# Patient Record
Sex: FEMALE | Employment: UNEMPLOYED | URBAN - METROPOLITAN AREA
[De-identification: names, ages, dates, MRNs, and addresses within clinical notes are randomized per-mention and may not be internally consistent; named-entity substitution may affect disease eponyms.]

---

## 2020-06-02 ENCOUNTER — TRANSFERRED RECORDS (OUTPATIENT)
Dept: HEALTH INFORMATION MANAGEMENT | Facility: CLINIC | Age: 40
End: 2020-06-02

## 2021-04-27 ENCOUNTER — TRANSFERRED RECORDS (OUTPATIENT)
Dept: HEALTH INFORMATION MANAGEMENT | Facility: CLINIC | Age: 41
End: 2021-04-27

## 2021-04-28 ENCOUNTER — TRANSCRIBE ORDERS (OUTPATIENT)
Dept: OTHER | Age: 41
End: 2021-04-28

## 2021-04-28 ENCOUNTER — DOCUMENTATION ONLY (OUTPATIENT)
Dept: OTHER | Age: 41
End: 2021-04-28

## 2021-04-28 DIAGNOSIS — C78.00 BREAST CANCER METASTASIZED TO LUNG (H): ICD-10-CM

## 2021-04-28 DIAGNOSIS — C50.919 BREAST CANCER (H): ICD-10-CM

## 2021-04-28 DIAGNOSIS — C50.919: ICD-10-CM

## 2021-04-28 DIAGNOSIS — C50.911 CANCER OF RIGHT BREAST METASTATIC TO BRAIN (H): Primary | ICD-10-CM

## 2021-04-28 DIAGNOSIS — C50.919 BREAST CANCER METASTASIZED TO LUNG (H): ICD-10-CM

## 2021-04-28 DIAGNOSIS — C79.31 CANCER OF RIGHT BREAST METASTATIC TO BRAIN (H): Primary | ICD-10-CM

## 2021-04-28 NOTE — PROGRESS NOTES
Rightfax referral received from [facility] - TERRIE Young is requesting an appointment with med onc for dx: breast cancer - No patient demographics included, only a name,  & diagnosis. I called [facility] & requested the patient demographics/face sheet. Marilu (HIM) will fax to NP Scheduling ([phone])

## 2021-04-30 NOTE — TELEPHONE ENCOUNTER
Action    Action Taken 4/30/21:    -[employer] Tracking/[place] ([place]) - Path: [id number]    5/5/21:    -Slides from [facility] received    -imaging resolved to PACS, updated CT Head: 5/3/21 requested/[facility].  9:48 AM    5/7/21:    Imaging resolved to PACS  8:31 AM       RECORDS STATUS - BREAST    RECORDS REQUESTED FROM: [facility] (Imaging previously resolved)   DATE REQUESTED:    NOTES DETAILS STATUS   OFFICE NOTE from referring provider [facility] Dr Kimberley Young   OFFICE NOTE from medical oncologist [facility] Dr. Joshua Villegas: 4/29/21   OFFICE NOTE from surgeon [facility] Dr. Pranay Villarreal   OFFICE NOTE from radiation oncologist [facility] Dr. Daniel Willis: 4/28/21   DISCHARGE SUMMARY from hospital [facility] 4/10/21, 3/8/21, 8/30/19   DISCHARGE REPORT from the [facility] 4/24/21, 4/15/21, 2/19/21, 1/7/21, 12/19/20, 10/3/20, 9/2/20, 8/25/20, 8/20/20, 7/2/20, 7/1/20, 5/16/20, 5/2/20, 4/16/20. 10/31/19, 9/17/19, 5/8/19, 3/16/19, 2/22/19   Spirometry Received 5/5, Sent to HIM for upload 4/28/21: [facility]   OPERATIVE REPORT [facility] 4/12/21: PleurX Placement    4/8/21, 3/15/21, 2/19/21, 1/11/21. 8/25/20: Thoracentesis    8/30/19: Mastectomy    8/20/19: Bx Left Axillary Lymph Node    5/9/19L Coronary Angiogram    2/19/19: Subcutaneous Port Insertion   MEDICATION LIST [facility]   CLINICAL TRIAL TREATMENTS TO DATE     LABS     PATHOLOGY REPORTS  (Tissue diagnosis, Stage, ER/UT percentage positive and intensity of staining, HER2 IHC, FISH, and all biopsies from breast and any distant metastasis)                 [facility]/[place], Reports in CE, Slides received 5/5 8/25/20: [id number]  6/2/19: [id number]  8/20/19: [id number]  1/23/19: [id number]   GENONOMIC TESTING     TYPE:   (Next Generation Sequencing, including Foundation One testing, and Oncotype score) Received 5/5, Sent to HIM for upload 6/2/20: PDL-1   IMAGING (NEED IMAGES & REPORT)     XR PACS 4/23/21 - 11/13/20: [facility]   CT SCANS  PACS 5/3/21 - 11/13/20: [facility]   MRI PACS 4/23/21 - 11/13/20: [facility]   MAMMO     ULTRASOUND PACS 4/23/21 - 11/13/20: [facility]   PET PACS 8/26/20, 6/1/20, 8/14/19, 2/11/19: [facility]   BONE SCAN PACS 9/1/20: [facility]   BRAIN MRI PACS 4/23/21 - 11/13/20: [facility]

## 2021-05-06 PROCEDURE — 999N001032 HC STATISTIC REVIEW OUTSIDE SLIDES TC 88321: Performed by: INTERNAL MEDICINE

## 2021-05-06 PROCEDURE — 88321 CONSLTJ&REPRT SLD PREP ELSWR: CPT | Performed by: PATHOLOGY

## 2021-05-10 ENCOUNTER — PRE VISIT (OUTPATIENT)
Dept: ONCOLOGY | Facility: CLINIC | Age: 41
End: 2021-05-10

## 2021-05-10 ENCOUNTER — VIRTUAL VISIT (OUTPATIENT)
Dept: ONCOLOGY | Facility: CLINIC | Age: 41
End: 2021-05-10
Attending: INTERNAL MEDICINE
Payer: MEDICAID

## 2021-05-10 DIAGNOSIS — C79.31 MALIGNANT NEOPLASM OF BREAST METASTATIC TO BRAIN, UNSPECIFIED LATERALITY (H): Primary | ICD-10-CM

## 2021-05-10 DIAGNOSIS — C50.919 MALIGNANT NEOPLASM OF BREAST METASTATIC TO BRAIN, UNSPECIFIED LATERALITY (H): Primary | ICD-10-CM

## 2021-05-10 PROCEDURE — 999N001193 HC VIDEO/TELEPHONE VISIT; NO CHARGE

## 2021-05-10 PROCEDURE — 99205 OFFICE O/P NEW HI 60 MIN: CPT | Mod: 95 | Performed by: INTERNAL MEDICINE

## 2021-05-10 RX ORDER — FUROSEMIDE 20 MG
20 TABLET ORAL
COMMUNITY
Start: 2020-11-19

## 2021-05-10 RX ORDER — FAMOTIDINE 20 MG/1
20 TABLET, FILM COATED ORAL
COMMUNITY
Start: 2021-03-09

## 2021-05-10 RX ORDER — POTASSIUM CHLORIDE 1500 MG/1
20 TABLET, EXTENDED RELEASE ORAL
COMMUNITY
Start: 2021-01-25

## 2021-05-10 RX ORDER — CITALOPRAM HYDROBROMIDE 20 MG/1
TABLET ORAL
COMMUNITY
Start: 2021-04-15

## 2021-05-10 RX ORDER — DEXAMETHASONE 4 MG/1
TABLET ORAL
COMMUNITY
Start: 2021-04-26

## 2021-05-10 RX ORDER — CARVEDILOL 3.12 MG/1
3.12 TABLET ORAL
COMMUNITY
Start: 2020-07-27

## 2021-05-10 RX ORDER — LEVETIRACETAM 500 MG/1
1500 TABLET ORAL
COMMUNITY
Start: 2021-05-04

## 2021-05-10 RX ORDER — DIAZEPAM ORAL SOLUTION (CONCENTRATE) 5 MG/ML
5 SOLUTION ORAL
COMMUNITY
Start: 2021-05-03

## 2021-05-10 NOTE — LETTER
"    5/10/2021         RE: Bipin Bai  [address] [place] [address] [address]  [facility] [address]        Dear Colleague,    Thank you for referring your patient, Bipin Bai, to the [facility]. Please see a copy of my visit note below.    Bipin is a 41 year old who is being evaluated via a billable video visit.      How would you like to obtain your AVS? MyChart  If the video visit is dropped, the invitation should be resent by: Text to cell phone: [phone]  Will anyone else be joining your video visit? No      I have reviewed and updated the patient's allergies and medication list.    Concerns: none  Refills: none     Vitals - Patient Reported  Weight (Patient Reported): 68 kg (150 lb)  Height (Patient Reported): 165.1 cm (5' 5\")  BMI (Based on Pt Reported Ht/Wt): 24.96    Chle Domingo CMA        Video Time:  45 min    Video-Visit Details    Type of service:  Video Visit    Originating Location (pt. Location): Home    Distant Location (provider location):  [facility]     Platform used for Video Visit: [employer]    More than 70 min were spent in reviewing outside records, outside pathology, images, and in speaking with the patient and coordinating care with specialists in [place].      May 10, 2021    Pt is seeking second opinion regarding metastatic breast cancer, brain and lung metastases    PRIMARY DIAGNOSES:  1. Premenopausal 39 y/o female with metastatic TRIPLE NEGATIVE breast cancer (lungs, right pleural cavity, LNs above and below diaphragm, skeleton and CNS)(+ cytology pleural fluid Aug 2020). Germline testing negative. Somatic NGS with PIK3CA mutation.   Disease refractory to multiple salvage regimens including Nab-Paclitaxel/atezolizumab, sacituzumab govitecan and alpelisib/fulvestrant.  Currently on salvage ixabepilone since April 2021.     2. CNS metastatic disease (2 lesions only) s/p SBRT Sept/Oct 2020. Complicated with radionecrosis and secondary " seizure, controlled with steroids and keppra.    3. Recurrent malignant right pleural effusion, s/p PleurX cath placement April 2021.    4. Treatment-induced diabetes (alpelisib and steroids) with recent decompensation.    5. PE (small burden non occlusive PA branch RLL) April 2021. Apixaban stopped due to allergic reaction. Currently on LMWH since 4/27/21.    6. Multiple brain metastases diagnosed after a seizure; was scheduled for whole brain radiation, pt was readmitted to the hospital, and was discharged to home on hospice care.    ONCOLOGIC HISTORY:  Dx Jan 2019 stage IIIA (cT3N2) ER/[place]/her 2 negative right breast adenoCA (initial bx ER+).  Neoadjuvant dose dense AC followed by T.  S/p right mastectomy with ALND Aug 2019 (contralateral prophylactic mastectomy). Path with 2 lesions (multifocal) 6.5 and 1.5 cm, grade 3/3, 3/4 LN+ (extranodal extension), LV+  Capecitabine x 6 cycles until Jan 2020.  XRT 60 Gy to right chest wall and draining lymphatics March 2020.  Tamoxifen since Jan 2020.  Disease progression May 2020 (PET/CT with new lung, hilar and mediastinal LN and small peritoneal implant adjacent to liver).  Lung bx c/w metastatic PD adenoCA triple negative.  Brain MRI negative.  Salvage Nab-Paclitaxel + Atezolizumab since 6/9/20. Completed 3 cycles on 8/11. Overall good tolerance with exception of PNP and fatigue.  ER visit 7/1 with SOB and chest pain with CT showing mild progression of lung nodules. 7/22 ER visit abdominal pain. CT abd/pelvis no new findings.  ER visit 8/20 with SOB and cough. Troponin negative, D-dimer +, CTA no PE but noted disease progression.  PET/CT 8/26 - Disease progression (right pleural effusion, LN above and below diaphragm, bone).  US-guided thoracentesis 700 cc clear straw colored fluid. Cytology pleural fluid + metastatic adenoCA c/w breast primary.     Sept 2020 - Brain MRI 2.7 cm enhancing lesion right post parietal.  SRS 27 Gy completed 9/10/20.  9/17/20 - Started ADC  Sacituzumab/govitecan 10 mg/kg on D1 and D8 every 21 days with G-CSF support.  9/25/20 - Brain MRI new 0.8 cm lesion left periventricular (lateral ventricle), previous lesion right parietal area with post XRT changes.  SRS 27 Gy to new lesion completed 10/9/20.     11/23/20 restaging studies with brain MRI and CT CAP with evidence of GA after 4 cycles of ADC.     Jan 2021 - Disease progression (lung metastases, right pleural effusion and suspected lymphangitic spread).  1/16/21 - Started salvage ALPELISIB (+ Faslodex).    4/10/21 - Disease progression with CT showing worsening bilateral lung lesions, right pleural effusion, mediastinal adenopathy. Non occlusive small thrombus in RLL PA. Started apixaban 4/12/21.    4/12/21 - Pleurx cath placed right pleural cavity for recurrent malignant pleural effusion.    4/15/21 c/o auditory hallucinations. CT brain (no contrast) no bleeding or mass effect.    4/22/21 - Started ixabepilone.    4/23/21 - Follow up MRI (previously ordered by Dr. Willis), CNS progression in cerebellum and hemispheres with at least 8 new enhancing lesions with moderate amount of edema.  Was scheduled WBRT 5/3/21 --> this was never started    HISTORY OF PRESENT ILLNESS:  Bipin Bai is a 41 year old female who presents to the Hematology/Oncology for reassessment of refractory triple negative MBC.  She is here for a second opinion.    VIDEO VISIT    Bipin comes today over a video visit with her sister for a second opinion and consultation regarding metastatic refractory triple-negative breast cancer.      Her disease is as outlined above.  In discussion with Bipin, she was recently hospitalized with a second seizure.  At that time, multiple brain lesions were visualized.  It was recommended previously that she receive whole brain radiation therapy.  This was never started and she was discharged with hospice care.  She comes in today for a second opinion with her sister.  I am unsure that she wants  to do hospice care and is wondering what her treatment options are.    In discussion with her and her sister, she reports no pain.  Her biggest limiting factor is some paralysis of her left side since she had a seizure.  She notices weakness on her left more than right side.  It is hard to get out of bed without assistance.  She does use a walker or wheelchair around her at home.  She is getting home care services with [facility] and they enrolled on Friday 05/07/2021.    In discussions with them, they expressed their Anabaptism sp.  They would like to continue to try to do everything possible for her disease and treatment.  In discussion, she takes a nap frequently during the day.  It sounds like a performance status of approximately 2.  Since she has been on treatment with Keppra and low doses of Decadron, her energy is improved and she is sleeping only at nighttime and not during the day.  She otherwise has remained in bed, given her weakness.    No fevers or chills.  No shortness of breath.  No nausea, vomiting, diarrhea or constipation.  She does have a known pleural effusion as well as PEs.  She does not describe any symptoms related to this at this time.    She is here on the video with her sister.  She is being treated with Dr. Willis in [place].    Her 10-point review of systems is otherwise negative.  Most recently, she was on treatment with salvage ixabepilone.  It does not appear she has had any therapy with gemcitabine, carboplatin, eribulin.       PMH, SH and FH reviewed in EMR and unchanged.    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)     Depression, unspecified depression type     Cancer related pain      Fatigue due to treatment     History of lymph node dissection of right axilla     Bilateral leg weakness     At risk for falls     Lymphangitic lung metastasis (HCC)     Brain metastasis (HCC)     Metastatic breast cancer (HCC)     Focal seizure (HCC)     Malignant pleural effusion     Lymphedema of right upper extremity     Hypokalemia     Hypomagnesemia     Other pulmonary embolism without acute cor pulmonale (HCC)     Current Outpatient Medications   Medication     carvedilol (COREG) 3.125 MG tablet     citalopram (CELEXA) 20 MG tablet     dexamethasone (DECADRON) 4 MG tablet     diazepam (VALIUM) 5 MG/ML (HIGH CONC) solution     famotidine (PEPCID) 20 MG tablet     furosemide (LASIX) 20 MG tablet     levETIRAcetam (KEPPRA) 500 MG tablet     potassium chloride ER (KLOR-CON M) 20 MEQ CR tablet     No current facility-administered medications for this visit.      Allergies reviewed.    SH:  .  Moravian.  She is here with her sister today.  She has another sister and another brother near by who are supportive.      No family history of malignancy.    GENERAL: Healthy, alert and no distress  EYES: Eyes grossly normal to inspection.  No discharge or erythema, or obvious scleral/conjunctival abnormalities.  RESP: No audible wheeze, cough, or visible cyanosis.  No visible retractions or increased work of breathing.    SKIN: Visible skin clear. No significant rash, abnormal pigmentation or lesions.  NEURO: Cranial nerves grossly intact.  Mentation and speech appropriate for age.  PSYCH: Mentation appears normal, affect normal/bright, judgement and insight intact, normal speech and appearance well-groomed.      Reviewed recent labs, imaging and pathology    A/P:  40 y/o female with triple negative breast cancer with brain metastases multiple lung metastases now s/p multiple lines of chemotherapy including AC, taxol, xeloda, ixebipilone, Abraxane/atezolizumab, as well as a variety of hormone therapies given the tumor  was weakly positive.    I reviewed today with Bipin her overall disease history as outlined above.  I discussed with her that, given her brain metastases and a performance status of 2, it would be reasonable to consider pursuing whole brain radiation therapy.  We discussed that this would require the patient to go into clinic daily.  She and her sister expressed that they are able to do this.  We reviewed that currently she is enrolled in hospice care in the [facility].  It is not clear to me whether she would need to un-enroll in hospice in order to participate in whole brain radiation therapy.  We reviewed overall that currently her symptoms from her brain metastases include seizures as well as left sided weakness.  It is unclear whether this would improve or resolve with the use of radiation therapy. There may be some benefit in potentially preventing further worsening of these symptoms.    I discussed with her I would review her case with Dr. Willis, who has seen her in the [facility]. I did put a call into his office today.  Continue Decadron and Keppra.    As far as systemic therapy, I discussed with her that given her performance status of 2 I would not recommend IV chemotherapy.  In rare instances, these tumors can be androgen positive in which case they may benefit from androgen deprivation.  She is interested in hearing more about this today.  We have received her pathology specimens and I will ask the pathologist to add on an androgen stain.    I put a call into her primary oncologist in the [facility] to better understand her story.  She understands that currently she is enrolled in hospice care and she is asking questions about further treatment. Given she is alert and oriented and speaking clearly and fluently, it would be reasonable to have further conversations with her primary oncology team there to better understand treatment options for the patient.    She reports no pain.    For her seizures, she is on  Decadron and Keppra.    I will plan to retouch base with Anafarshad when I have this other information back for the androgen receptor as well as in discussing her situation with my colleagues.    It was a pleasure to see Bipin for a second opinion and consultation.    Again, thank you for allowing me to participate in the care of your patient.        Sincerely,        Danni Rao MD

## 2021-05-10 NOTE — PROGRESS NOTES
"Bipin is a 41 year old who is being evaluated via a billable video visit.      How would you like to obtain your AVS? MyChart  If the video visit is dropped, the invitation should be resent by: Text to cell phone: [phone]  Will anyone else be joining your video visit? No      I have reviewed and updated the patient's allergies and medication list.    Concerns: none  Refills: none     Vitals - Patient Reported  Weight (Patient Reported): 68 kg (150 lb)  Height (Patient Reported): 165.1 cm (5' 5\")  BMI (Based on Pt Reported Ht/Wt): 24.96    Chel Domingo CMA        Video Time:  45 min    Video-Visit Details    Type of service:  Video Visit    Originating Location (pt. Location): Home    Distant Location (provider location):  [facility]     Platform used for Video Visit: Osmopure    More than 70 min were spent in reviewing outside records, outside pathology, images, and in speaking with the patient and coordinating care with specialists in [place].      May 10, 2021    Pt is seeking second opinion regarding metastatic breast cancer, brain and lung metastases    PRIMARY DIAGNOSES:  1. Premenopausal 41 y/o female with metastatic TRIPLE NEGATIVE breast cancer (lungs, right pleural cavity, LNs above and below diaphragm, skeleton and CNS)(+ cytology pleural fluid Aug 2020). Germline testing negative. Somatic NGS with PIK3CA mutation.   Disease refractory to multiple salvage regimens including Nab-Paclitaxel/atezolizumab, sacituzumab govitecan and alpelisib/fulvestrant.  Currently on salvage ixabepilone since April 2021.     2. CNS metastatic disease (2 lesions only) s/p SBRT Sept/Oct 2020. Complicated with radionecrosis and secondary seizure, controlled with steroids and keppra.    3. Recurrent malignant right pleural effusion, s/p PleurX cath placement April 2021.    4. Treatment-induced diabetes (alpelisib and steroids) with recent decompensation.    5. PE (small burden non occlusive PA " branch RLL) April 2021. Apixaban stopped due to allergic reaction. Currently on LMWH since 4/27/21.    6. Multiple brain metastases diagnosed after a seizure; was scheduled for whole brain radiation, pt was readmitted to the hospital, and was discharged to home on hospice care.    ONCOLOGIC HISTORY:  Dx Jan 2019 stage IIIA (cT3N2) ER/GA/her 2 negative right breast adenoCA (initial bx ER+).  Neoadjuvant dose dense AC followed by T.  S/p right mastectomy with ALND Aug 2019 (contralateral prophylactic mastectomy). Path with 2 lesions (multifocal) 6.5 and 1.5 cm, grade 3/3, 3/4 LN+ (extranodal extension), LV+  Capecitabine x 6 cycles until Jan 2020.  XRT 60 Gy to right chest wall and draining lymphatics March 2020.  Tamoxifen since Jan 2020.  Disease progression May 2020 (PET/CT with new lung, hilar and mediastinal LN and small peritoneal implant adjacent to liver).  Lung bx c/w metastatic PD adenoCA triple negative.  Brain MRI negative.  Salvage Nab-Paclitaxel + Atezolizumab since 6/9/20. Completed 3 cycles on 8/11. Overall good tolerance with exception of PNP and fatigue.  ER visit 7/1 with SOB and chest pain with CT showing mild progression of lung nodules. 7/22 ER visit abdominal pain. CT abd/pelvis no new findings.  ER visit 8/20 with SOB and cough. Troponin negative, D-dimer +, CTA no PE but noted disease progression.  PET/CT 8/26 - Disease progression (right pleural effusion, LN above and below diaphragm, bone).  US-guided thoracentesis 700 cc clear straw colored fluid. Cytology pleural fluid + metastatic adenoCA c/w breast primary.     Sept 2020 - Brain MRI 2.7 cm enhancing lesion right post parietal.  SRS 27 Gy completed 9/10/20.  9/17/20 - Started ADC Sacituzumab/govitecan 10 mg/kg on D1 and D8 every 21 days with G-CSF support.  9/25/20 - Brain MRI new 0.8 cm lesion left periventricular (lateral ventricle), previous lesion right parietal area with post XRT changes.  SRS 27 Gy to new lesion completed  10/9/20.     11/23/20 restaging studies with brain MRI and CT CAP with evidence of OH after 4 cycles of ADC.     Jan 2021 - Disease progression (lung metastases, right pleural effusion and suspected lymphangitic spread).  1/16/21 - Started salvage ALPELISIB (+ Faslodex).    4/10/21 - Disease progression with CT showing worsening bilateral lung lesions, right pleural effusion, mediastinal adenopathy. Non occlusive small thrombus in RLL PA. Started apixaban 4/12/21.    4/12/21 - Pleurx cath placed right pleural cavity for recurrent malignant pleural effusion.    4/15/21 c/o auditory hallucinations. CT brain (no contrast) no bleeding or mass effect.    4/22/21 - Started ixabepilone.    4/23/21 - Follow up MRI (previously ordered by Dr. Willis), CNS progression in cerebellum and hemispheres with at least 8 new enhancing lesions with moderate amount of edema.  Was scheduled WBRT 5/3/21 --> this was never started    HISTORY OF PRESENT ILLNESS:  Bipin Bai is a 41 year old female who presents to the Hematology/Oncology for reassessment of refractory triple negative MBC.  She is here for a second opinion.    VIDEO VISIT    Bipin comes today over a video visit with her sister for a second opinion and consultation regarding metastatic refractory triple-negative breast cancer.      Her disease is as outlined above.  In discussion with Bipin, she was recently hospitalized with a second seizure.  At that time, multiple brain lesions were visualized.  It was recommended previously that she receive whole brain radiation therapy.  This was never started and she was discharged with hospice care.  She comes in today for a second opinion with her sister.  I am unsure that she wants to do hospice care and is wondering what her treatment options are.    In discussion with her and her sister, she reports no pain.  Her biggest limiting factor is some paralysis of her left side since she had a seizure.  She notices weakness on her  left more than right side.  It is hard to get out of bed without assistance.  She does use a walker or wheelchair around her at home.  She is getting home care services with [facility] and they enrolled on Friday 05/07/2021.    In discussions with them, they expressed their [employer] sp.  They would like to continue to try to do everything possible for her disease and treatment.  In discussion, she takes a nap frequently during the day.  It sounds like a performance status of approximately 2.  Since she has been on treatment with Keppra and low doses of Decadron, her energy is improved and she is sleeping only at nighttime and not during the day.  She otherwise has remained in bed, given her weakness.    No fevers or chills.  No shortness of breath.  No nausea, vomiting, diarrhea or constipation.  She does have a known pleural effusion as well as PEs.  She does not describe any symptoms related to this at this time.    She is here on the video with her sister.  She is being treated with Dr. Willis in [place].    Her 10-point review of systems is otherwise negative.  Most recently, she was on treatment with salvage ixabepilone.  It does not appear she has had any therapy with gemcitabine, carboplatin, eribulin.       PMH, SH and FH reviewed in EMR and unchanged.    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)     Depression, unspecified depression type     Cancer related pain     Fatigue due to treatment     History of lymph node dissection of right axilla     Bilateral leg weakness     At risk for falls     Lymphangitic lung metastasis (HCC)     Brain metastasis (HCC)     Metastatic breast cancer (HCC)     Focal  seizure (HCC)     Malignant pleural effusion     Lymphedema of right upper extremity     Hypokalemia     Hypomagnesemia     Other pulmonary embolism without acute cor pulmonale (HCC)     Current Outpatient Medications   Medication     carvedilol (COREG) 3.125 MG tablet     citalopram (CELEXA) 20 MG tablet     dexamethasone (DECADRON) 4 MG tablet     diazepam (VALIUM) 5 MG/ML (HIGH CONC) solution     famotidine (PEPCID) 20 MG tablet     furosemide (LASIX) 20 MG tablet     levETIRAcetam (KEPPRA) 500 MG tablet     potassium chloride ER (KLOR-CON M) 20 MEQ CR tablet     No current facility-administered medications for this visit.      Allergies reviewed.    SH:  .  Spiritism.  She is here with her sister today.  She has another sister and another brother near by who are supportive.      No family history of malignancy.    GENERAL: Healthy, alert and no distress  EYES: Eyes grossly normal to inspection.  No discharge or erythema, or obvious scleral/conjunctival abnormalities.  RESP: No audible wheeze, cough, or visible cyanosis.  No visible retractions or increased work of breathing.    SKIN: Visible skin clear. No significant rash, abnormal pigmentation or lesions.  NEURO: Cranial nerves grossly intact.  Mentation and speech appropriate for age.  PSYCH: Mentation appears normal, affect normal/bright, judgement and insight intact, normal speech and appearance well-groomed.      Reviewed recent labs, imaging and pathology    A/P:  40 y/o female with triple negative breast cancer with brain metastases multiple lung metastases now s/p multiple lines of chemotherapy including AC, taxol, xeloda, ixebipilone, Abraxane/atezolizumab, as well as a variety of hormone therapies given the tumor was weakly positive.    I reviewed today with Bipin her overall disease history as outlined above.  I discussed with her that, given her brain metastases and a performance status of 2, it would be reasonable to consider pursuing whole brain  radiation therapy.  We discussed that this would require the patient to go into clinic daily.  She and her sister expressed that they are able to do this.  We reviewed that currently she is enrolled in hospice care in the [facility].  It is not clear to me whether she would need to un-enroll in hospice in order to participate in whole brain radiation therapy.  We reviewed overall that currently her symptoms from her brain metastases include seizures as well as left sided weakness.  It is unclear whether this would improve or resolve with the use of radiation therapy. There may be some benefit in potentially preventing further worsening of these symptoms.    I discussed with her I would review her case with Dr. Willis, who has seen her in the [facility]. I did put a call into his office today.  Continue Decadron and Keppra.    As far as systemic therapy, I discussed with her that given her performance status of 2 I would not recommend IV chemotherapy.  In rare instances, these tumors can be androgen positive in which case they may benefit from androgen deprivation.  She is interested in hearing more about this today.  We have received her pathology specimens and I will ask the pathologist to add on an androgen stain.    I put a call into her primary oncologist in the [facility] to better understand her story.  She understands that currently she is enrolled in hospice care and she is asking questions about further treatment. Given she is alert and oriented and speaking clearly and fluently, it would be reasonable to have further conversations with her primary oncology team there to better understand treatment options for the patient.    She reports no pain.    For her seizures, she is on Decadron and Keppra.    I will plan to retouch base with Bipin when I have this other information back for the androgen receptor as well as in discussing her situation with my colleagues.    It was a pleasure to see Bipin for a second  opinion and consultation.

## 2021-05-18 LAB — COPATH REPORT: NORMAL

## 2021-06-27 ENCOUNTER — HEALTH MAINTENANCE LETTER (OUTPATIENT)
Age: 41
End: 2021-06-27

## 2021-10-17 ENCOUNTER — HEALTH MAINTENANCE LETTER (OUTPATIENT)
Age: 41
End: 2021-10-17

## 2022-07-24 ENCOUNTER — HEALTH MAINTENANCE LETTER (OUTPATIENT)
Age: 42
End: 2022-07-24

## 2022-10-03 ENCOUNTER — HEALTH MAINTENANCE LETTER (OUTPATIENT)
Age: 42
End: 2022-10-03

## 2023-08-12 ENCOUNTER — HEALTH MAINTENANCE LETTER (OUTPATIENT)
Age: 43
End: 2023-08-12

## 2024-03-09 ENCOUNTER — HEALTH MAINTENANCE LETTER (OUTPATIENT)
Age: 44
End: 2024-03-09